# Patient Record
Sex: FEMALE
[De-identification: names, ages, dates, MRNs, and addresses within clinical notes are randomized per-mention and may not be internally consistent; named-entity substitution may affect disease eponyms.]

---

## 2018-06-01 PROBLEM — Z00.00 ENCOUNTER FOR PREVENTIVE HEALTH EXAMINATION: Status: ACTIVE | Noted: 2018-06-01

## 2018-09-17 ENCOUNTER — APPOINTMENT (OUTPATIENT)
Dept: VASCULAR SURGERY | Facility: CLINIC | Age: 78
End: 2018-09-17

## 2021-04-30 ENCOUNTER — APPOINTMENT (OUTPATIENT)
Dept: VASCULAR SURGERY | Facility: CLINIC | Age: 81
End: 2021-04-30
Payer: MEDICARE

## 2021-04-30 ENCOUNTER — APPOINTMENT (OUTPATIENT)
Dept: VASCULAR SURGERY | Facility: CLINIC | Age: 81
End: 2021-04-30

## 2021-04-30 VITALS
HEART RATE: 80 BPM | SYSTOLIC BLOOD PRESSURE: 157 MMHG | DIASTOLIC BLOOD PRESSURE: 115 MMHG | WEIGHT: 147 LBS | BODY MASS INDEX: 27.75 KG/M2 | HEIGHT: 61 IN

## 2021-04-30 VITALS — TEMPERATURE: 95.7 F

## 2021-04-30 DIAGNOSIS — I87.2 VENOUS INSUFFICIENCY (CHRONIC) (PERIPHERAL): ICD-10-CM

## 2021-04-30 DIAGNOSIS — I83.90 ASYMPTOMATIC VARICOSE VEINS OF UNSPECIFIED LOWER EXTREMITY: ICD-10-CM

## 2021-04-30 PROCEDURE — 99203 OFFICE O/P NEW LOW 30 MIN: CPT

## 2021-04-30 PROCEDURE — 99072 ADDL SUPL MATRL&STAF TM PHE: CPT

## 2021-04-30 PROCEDURE — 93970 EXTREMITY STUDY: CPT

## 2021-04-30 NOTE — PHYSICAL EXAM
[2+] : left 2+ [Varicose Veins Of Lower Extremities] : bilaterally [Ankle Swelling Bilaterally] : severe [] : bilaterally [Ankle Swelling On The Right] : mild [No Rash or Lesion] : No rash or lesion [Alert] : alert [Oriented to Person] : oriented to person [Oriented to Place] : oriented to place [Oriented to Time] : oriented to time [Calm] : calm [JVD] : no jugular venous distention  [Normal Breath Sounds] : Normal breath sounds [Normal Heart Sounds] : normal heart sounds [Ankle Swelling (On Exam)] : not present [Abdomen Masses] : No abdominal masses [Skin Ulcer] : no ulcer [de-identified] : appears well [de-identified] : no calf tenderness, no palpable cords

## 2021-04-30 NOTE — HISTORY OF PRESENT ILLNESS
[FreeTextEntry1] : 79 yo female with a history of varicose veins presents for evaluation.  pt states that the veins are more prominent and painful after she is walking and pain is slightly improved with elevation.  pt also reports a history of right knee arthritis and is pending possible surgery for the right knee.  pt denies any history of dvt, ulcers or bleeding from the varicose veins.  pt states that she does not wear compression stockings.

## 2021-04-30 NOTE — ASSESSMENT
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

## 2021-04-30 NOTE — CONSULT LETTER
[Dear  ___] : Dear  [unfilled], [Consult Letter:] : I had the pleasure of evaluating your patient, [unfilled]. [Please see my note below.] : Please see my note below. [Consult Closing:] : Thank you very much for allowing me to participate in the care of this patient.  If you have any questions, please do not hesitate to contact me. [Sincerely,] : Sincerely, [FreeTextEntry3] : Jose L Frausto M.D., F.SRINIS., R.P.DON.I.\par  of Vascular Surgery\par Assistant Professor of Radiology\par Director of Endovascular Program/ [facility]\par [facility]

## 2021-07-30 ENCOUNTER — APPOINTMENT (OUTPATIENT)
Dept: VASCULAR SURGERY | Facility: CLINIC | Age: 81
End: 2021-07-30